# Patient Record
Sex: FEMALE | Race: OTHER | NOT HISPANIC OR LATINO
[De-identification: names, ages, dates, MRNs, and addresses within clinical notes are randomized per-mention and may not be internally consistent; named-entity substitution may affect disease eponyms.]

---

## 2020-03-30 ENCOUNTER — APPOINTMENT (OUTPATIENT)
Dept: HEART AND VASCULAR | Facility: CLINIC | Age: 65
End: 2020-03-30

## 2020-03-30 PROBLEM — Z00.00 ENCOUNTER FOR PREVENTIVE HEALTH EXAMINATION: Status: ACTIVE | Noted: 2020-03-30

## 2022-08-29 ENCOUNTER — APPOINTMENT (OUTPATIENT)
Dept: HEART AND VASCULAR | Facility: CLINIC | Age: 67
End: 2022-08-29

## 2022-08-29 DIAGNOSIS — Q27.9 CONGENITAL MALFORMATION OF PERIPHERAL VASCULAR SYSTEM, UNSPECIFIED: ICD-10-CM

## 2022-08-29 PROCEDURE — 99214 OFFICE O/P EST MOD 30 MIN: CPT

## 2022-08-29 NOTE — PHYSICAL EXAM
[Alert] : alert [No Acute Distress] : no acute distress [Well Nourished] : well nourished [PERRL] : pupils equal, round and reactive to light [Normal Hearing] : hearing was normal [No Neck Mass] : no neck mass was observed [Supple] : the neck was supple [No LAD] : no lymphadenopathy [No Respiratory Distress] : no respiratory distress [Normal Rate and Effort] : normal respiratory rhythm and effort [No Accessory Muscle Use] : no accessory muscle use [Not Tender] : non-tender [Soft] : abdomen soft [Not Distended] : not distended [No Motor Deficits] : the motor exam was normal [No Tremors] : no tremors [No Sensory Deficits] : the sensory exam was normal to light touch and pinprick [Oriented x3] : oriented to person, place, and time [Normal Insight/Judgement] : insight and judgment were intact [Normal Affect] : the affect was normal

## 2022-08-30 NOTE — ADDENDUM
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 She has had multiple surgical procedures as well as numerous embolizations and sclerotherapy procedures, most recently 5 years ago by Dr. Ramos. She lives in [place] and is here just for this consultation. She said her current issue is pain on the dorsum of the left hand where she has extensive venous malformation as well as over the elbow where she also has a prominent soft tissue mass which is tender. The arm shows diffuse involvement with the venous malformation with muscle wasting and in addition she has flexion contractures at the elbow and all of the digits. She has some movement in the hand but it is very weak with incomplete . Sensation is normal. She has had both alcohol and bleomycin embolizations in the past. She said the alcohol was used on the palm and was quite effective although it was accompanied by extensive blistering and prolonged recovery. She said she had good results with the other agents as well. She does not recall how much bleomycin she has had or how many procedures used that agent. Past history is remarkable for hypertension and hyperlipidemia for which she is on medication. Generally she is in good health and works in clerical activity. She can type using both hands without too much difficulty. I went over the options and suggested that we can treat the symptomatic areas most likely using STS foam. She has not had any imaging study for several years so we gave her a prescription for a new MRI. She has an allergy to Diflucan.\par

## 2022-08-30 NOTE — HISTORY OF PRESENT ILLNESS
[FreeTextEntry1] : 67yo F CAD( presented with unstable angina s/p PCI and BROOKLYN to unknown location in 2016, on ASA and Plavix), HTN, HLD with venous malformation involving the left upper extremity from shoulder down to fingers who presents for consultation. \par Mrs Bess came from [facility]. She previously treated by Dr. Kamla Ramos. Last intervention in 2018 with direct embolization of the L elbow and dorsum aspect of the left hand using Bleomycin with good function improvement. In 2013, she underwent embolization of VM involving the palmar aspect of the left hand using EtOH with excellent result.  She had at least 5 procedures with Dr. Ramos. 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 In 1993, she had resection of venous malformation of the left upper hand and vein grafting at [facility] by a CT surgeon complicated by bleeding post op without improvement of symptoms. \par She presents with pain worsening throughout the day with typing at the dorsum aspect of the left hand and hypersensitivity along the VM area in her left elbow. She has significant venous malformation along the all 5 digits of the left hand with purple and blue appearance and three prominent lumps along the dorsum aspect of the left hand.Contracture of the elbow and 2nd, 3rd and 4th digit noted.  \par She's allergic to fluconazole with hives. \par Cardiologist: Britton Gerardo [place]. [place]. \par

## 2022-08-30 NOTE — REASON FOR VISIT
[Consultation] : a consultation visit [FreeTextEntry1] : venous malformation of the left upper extremity

## 2022-08-31 ENCOUNTER — TRANSCRIPTION ENCOUNTER (OUTPATIENT)
Age: 67
End: 2022-08-31

## 2023-02-14 RX ORDER — CHLORHEXIDINE GLUCONATE 213 G/1000ML
1 SOLUTION TOPICAL ONCE
Refills: 0 | Status: DISCONTINUED | OUTPATIENT
Start: 2023-02-15 | End: 2023-02-16

## 2023-02-14 NOTE — H&P ADULT - NSICDXPASTMEDICALHX_GEN_ALL_CORE_FT
PAST MEDICAL HISTORY:  CAD (coronary artery disease)     HLD (hyperlipidemia)     HTN (hypertension)     Venous malformation

## 2023-02-14 NOTE — H&P ADULT - ASSESSMENT
68 yo F with a PMH of HTN, HLD, CAD s/p BROOKLYN in 2016, venous malformation of LUE -- KT Syndrome (from shoulder down to fingers) s/p multiple surgeries since 8 yo including resection of venous malformation of L upper hand and vein grafting in 1993 as well as multiple embolizations most recently 2018 w/ DSE to L elbow and dorsum aspect of L hand using Bleomycin who presented to Dr. Oseguera for consultation. She presents with pain worsening throughout the day with typing at the dorsum aspect of the L hand and hypersensitivity along the VM area in her L elbow. She has significant venous malformation along all the 5 digits of the L hand with purple and blue appearance and 3 prominent lumps along the dorsum aspect of the L hand. Contracture of the elbow and 2nd, 3rd, and 4th digit noted. The arms shows diffuse involvement with the venous malformation with muscle wasting in addition she has flexion contractures at the elbow and all the digits. She has some movement in the hand but it is very weak with incomplete . Sensation is normal. 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 Pt now presents for DSE of known venous malformation        Consent to be obtained by Dr. Roach Team   Risks & benefits of procedure and alternative therapy have been explained to the patient including but not limited to: allergic reaction, bleeding w/possible need for blood transfusion, infection, renal and vascular compromise, limb damage, emergent surgery. Informed consent obtained and in chart.

## 2023-02-14 NOTE — H&P ADULT - HISTORY OF PRESENT ILLNESS
IR: Dr. Oseguera, previously Dr. Ramos  Cardiologist: Dr. Britton Gerardo ([facility])  Escort:  Pharmacy:  COVID:    *Verify Meds*  *Pt lives in [place]    66 yo F with a PMH of HTN, HLD, CAD s/p BROOKLYN in 2016, venous malformation of LUE -- KT Syndrome (from shoulder down to fingers) s/p multiple surgeries since 6 yo including resection of venous malformation of L upper hand and vein grafting in 1993 as well as multiple embolizations most recently 2018 w/ DSE to L elbow and dorsum aspect of L hand using Bleomycin who presented to Dr. Oseguera for consultation. She presents with pain worsening throughout the day with typing at the dorsum aspect of the L hand and hypersensitivity along the VM area in her L elbow. She has significant venous malformation along all the 5 digits of the L hand with purple and blue appearance and 3 prominent lumps along the dorsum aspect of the L hand. Contracture of the elbow and 2nd, 3rd, and 4th digit noted. The arms shows diffuse involvement with the venous malformation with muscle wasting in addition she has flexion contractures at the elbow and all the digits. She has some movement in the hand but it is very weak with incomplete . Sensation is normal. 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 Pt now presents for DSE of known venous malformation.  IR: Dr. Oseguera, previously Dr. Ramos  Cardiologist: Dr. Britton Gerardo ([facility])  Escort:  Pharmacy: [facility] otherwise home pharmacy is [facility] [phone]  COVID: Negative       68 yo F with a PMH of HTN, HLD, CAD s/p BROOKLYN in 2016, venous malformation of LUE -- KT Syndrome (from shoulder down to fingers) s/p multiple surgeries since 6 yo including resection of venous malformation of L upper hand and vein grafting in 1993 as well as multiple embolizations most recently 2018 w/ DSE to L elbow and dorsum aspect of L hand using Bleomycin who presented to Dr. Oseguera for consultation. She presents with pain worsening throughout the day with typing at the dorsum aspect of the L hand and hypersensitivity along the VM area in her L elbow. She has significant venous malformation along all the 5 digits of the L hand with purple and blue appearance and 3 prominent lumps along the dorsum aspect of the L hand. Contracture of the elbow and 2nd, 3rd, and 4th digit noted. The arms shows diffuse involvement with the venous malformation with muscle wasting in addition she has flexion contractures at the elbow and all the digits. She has some movement in the hand but it is very weak with incomplete . Sensation is normal. 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 Pt now presents for DSE of known venous malformation.  IR: Dr. Oseguera, previously Dr. Ramos  Cardiologist: Dr. Britton Gerardo ([facility])  Escort: Does not Have   Pharmacy: [facility] otherwise home pharmacy is [facility] [phone]  COVID: Negative       68 yo F with a PMH of HTN, HLD, CAD s/p BROOKLYN in 2016, venous malformation of LUE -- KT Syndrome (from shoulder down to fingers) s/p multiple surgeries since 6 yo including resection of venous malformation of L upper hand and vein grafting in 1993 as well as multiple embolizations most recently 2018 w/ DSE to L elbow and dorsum aspect of L hand using Bleomycin who presented to Dr. Oseguera for consultation. She presents with pain worsening throughout the day with typing at the dorsum aspect of the L hand and hypersensitivity along the VM area in her L elbow. She has significant venous malformation along all the 5 digits of the L hand with purple and blue appearance and 3 prominent lumps along the dorsum aspect of the L hand. Contracture of the elbow and 2nd, 3rd, and 4th digit noted. The arms shows diffuse involvement with the venous malformation with muscle wasting in addition she has flexion contractures at the elbow and all the digits. She has some movement in the hand but it is very weak with incomplete . Sensation is normal. 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 Pt now presents for DSE of known venous malformation.

## 2023-02-15 ENCOUNTER — INPATIENT (INPATIENT)
Facility: HOSPITAL | Age: 68
LOS: 0 days | Discharge: HOME | DRG: 254 | End: 2023-02-16
Attending: RADIOLOGY | Admitting: RADIOLOGY
Payer: COMMERCIAL

## 2023-02-15 ENCOUNTER — TRANSCRIPTION ENCOUNTER (OUTPATIENT)
Age: 68
End: 2023-02-15

## 2023-02-15 VITALS
DIASTOLIC BLOOD PRESSURE: 56 MMHG | WEIGHT: 134.48 LBS | TEMPERATURE: 99 F | HEIGHT: 66 IN | OXYGEN SATURATION: 97 % | HEART RATE: 77 BPM | RESPIRATION RATE: 16 BRPM | SYSTOLIC BLOOD PRESSURE: 116 MMHG

## 2023-02-15 LAB
ALBUMIN SERPL ELPH-MCNC: 4.2 G/DL — SIGNIFICANT CHANGE UP (ref 3.3–5)
ALP SERPL-CCNC: 67 U/L — SIGNIFICANT CHANGE UP (ref 40–120)
ALT FLD-CCNC: 19 U/L — SIGNIFICANT CHANGE UP (ref 10–45)
ANION GAP SERPL CALC-SCNC: 11 MMOL/L — SIGNIFICANT CHANGE UP (ref 5–17)
APTT BLD: 32.6 SEC — SIGNIFICANT CHANGE UP (ref 27.5–35.5)
AST SERPL-CCNC: 32 U/L — SIGNIFICANT CHANGE UP (ref 10–40)
BASOPHILS # BLD AUTO: 0.06 K/UL — SIGNIFICANT CHANGE UP (ref 0–0.2)
BASOPHILS NFR BLD AUTO: 1.4 % — SIGNIFICANT CHANGE UP (ref 0–2)
BILIRUB SERPL-MCNC: 0.6 MG/DL — SIGNIFICANT CHANGE UP (ref 0.2–1.2)
BUN SERPL-MCNC: 14 MG/DL — SIGNIFICANT CHANGE UP (ref 7–23)
CALCIUM SERPL-MCNC: 9.7 MG/DL — SIGNIFICANT CHANGE UP (ref 8.4–10.5)
CHLORIDE SERPL-SCNC: 107 MMOL/L — SIGNIFICANT CHANGE UP (ref 96–108)
CO2 SERPL-SCNC: 27 MMOL/L — SIGNIFICANT CHANGE UP (ref 22–31)
CREAT SERPL-MCNC: 1.16 MG/DL — SIGNIFICANT CHANGE UP (ref 0.5–1.3)
EGFR: 51 ML/MIN/1.73M2 — LOW
EOSINOPHIL # BLD AUTO: 0.19 K/UL — SIGNIFICANT CHANGE UP (ref 0–0.5)
EOSINOPHIL NFR BLD AUTO: 4.3 % — SIGNIFICANT CHANGE UP (ref 0–6)
GLUCOSE SERPL-MCNC: 105 MG/DL — HIGH (ref 70–99)
HCT VFR BLD CALC: 36.1 % — SIGNIFICANT CHANGE UP (ref 34.5–45)
HGB BLD-MCNC: 12.1 G/DL — SIGNIFICANT CHANGE UP (ref 11.5–15.5)
IMM GRANULOCYTES NFR BLD AUTO: 0.2 % — SIGNIFICANT CHANGE UP (ref 0–0.9)
INR BLD: 1.02 — SIGNIFICANT CHANGE UP (ref 0.88–1.16)
LYMPHOCYTES # BLD AUTO: 1.23 K/UL — SIGNIFICANT CHANGE UP (ref 1–3.3)
LYMPHOCYTES # BLD AUTO: 28.1 % — SIGNIFICANT CHANGE UP (ref 13–44)
MAGNESIUM SERPL-MCNC: 2.1 MG/DL — SIGNIFICANT CHANGE UP (ref 1.6–2.6)
MCHC RBC-ENTMCNC: 30.6 PG — SIGNIFICANT CHANGE UP (ref 27–34)
MCHC RBC-ENTMCNC: 33.5 GM/DL — SIGNIFICANT CHANGE UP (ref 32–36)
MCV RBC AUTO: 91.2 FL — SIGNIFICANT CHANGE UP (ref 80–100)
MONOCYTES # BLD AUTO: 0.38 K/UL — SIGNIFICANT CHANGE UP (ref 0–0.9)
MONOCYTES NFR BLD AUTO: 8.7 % — SIGNIFICANT CHANGE UP (ref 2–14)
NEUTROPHILS # BLD AUTO: 2.51 K/UL — SIGNIFICANT CHANGE UP (ref 1.8–7.4)
NEUTROPHILS NFR BLD AUTO: 57.3 % — SIGNIFICANT CHANGE UP (ref 43–77)
NRBC # BLD: 0 /100 WBCS — SIGNIFICANT CHANGE UP (ref 0–0)
PLATELET # BLD AUTO: 221 K/UL — SIGNIFICANT CHANGE UP (ref 150–400)
POTASSIUM SERPL-MCNC: 4.4 MMOL/L — SIGNIFICANT CHANGE UP (ref 3.5–5.3)
POTASSIUM SERPL-SCNC: 4.4 MMOL/L — SIGNIFICANT CHANGE UP (ref 3.5–5.3)
PROT SERPL-MCNC: 6.4 G/DL — SIGNIFICANT CHANGE UP (ref 6–8.3)
PROTHROM AB SERPL-ACNC: 12.2 SEC — SIGNIFICANT CHANGE UP (ref 10.5–13.4)
RBC # BLD: 3.96 M/UL — SIGNIFICANT CHANGE UP (ref 3.8–5.2)
RBC # FLD: 11.7 % — SIGNIFICANT CHANGE UP (ref 10.3–14.5)
SODIUM SERPL-SCNC: 145 MMOL/L — SIGNIFICANT CHANGE UP (ref 135–145)
WBC # BLD: 4.38 K/UL — SIGNIFICANT CHANGE UP (ref 3.8–10.5)
WBC # FLD AUTO: 4.38 K/UL — SIGNIFICANT CHANGE UP (ref 3.8–10.5)

## 2023-02-15 PROCEDURE — 37241 VASC EMBOLIZE/OCCLUDE VENOUS: CPT

## 2023-02-15 PROCEDURE — 93010 ELECTROCARDIOGRAM REPORT: CPT

## 2023-02-15 RX ORDER — EVOLOCUMAB 140 MG/ML
0 INJECTION, SOLUTION SUBCUTANEOUS
Qty: 0 | Refills: 0 | DISCHARGE

## 2023-02-15 RX ORDER — METOPROLOL TARTRATE 50 MG
25 TABLET ORAL ONCE
Refills: 0 | Status: COMPLETED | OUTPATIENT
Start: 2023-02-15 | End: 2023-02-16

## 2023-02-15 RX ORDER — ASPIRIN/CALCIUM CARB/MAGNESIUM 324 MG
1 TABLET ORAL
Qty: 0 | Refills: 0 | DISCHARGE

## 2023-02-15 RX ORDER — SODIUM CHLORIDE 9 MG/ML
1000 INJECTION, SOLUTION INTRAVENOUS
Refills: 0 | Status: DISCONTINUED | OUTPATIENT
Start: 2023-02-15 | End: 2023-02-16

## 2023-02-15 RX ORDER — ATORVASTATIN CALCIUM 80 MG/1
1 TABLET, FILM COATED ORAL
Qty: 0 | Refills: 0 | DISCHARGE

## 2023-02-15 RX ORDER — ATORVASTATIN CALCIUM 80 MG/1
40 TABLET, FILM COATED ORAL ONCE
Refills: 0 | Status: COMPLETED | OUTPATIENT
Start: 2023-02-15 | End: 2023-02-15

## 2023-02-15 RX ORDER — CLOPIDOGREL BISULFATE 75 MG/1
1 TABLET, FILM COATED ORAL
Qty: 0 | Refills: 0 | DISCHARGE

## 2023-02-15 RX ORDER — ACETAMINOPHEN 500 MG
650 TABLET ORAL EVERY 6 HOURS
Refills: 0 | Status: DISCONTINUED | OUTPATIENT
Start: 2023-02-15 | End: 2023-02-16

## 2023-02-15 RX ORDER — METOPROLOL TARTRATE 50 MG
1 TABLET ORAL
Qty: 0 | Refills: 0 | DISCHARGE

## 2023-02-15 RX ADMIN — Medication 650 MILLIGRAM(S): at 23:14

## 2023-02-15 RX ADMIN — ATORVASTATIN CALCIUM 40 MILLIGRAM(S): 80 TABLET, FILM COATED ORAL at 21:47

## 2023-02-15 RX ADMIN — SODIUM CHLORIDE 100 MILLILITER(S): 9 INJECTION, SOLUTION INTRAVENOUS at 18:57

## 2023-02-15 NOTE — PRE-ANESTHESIA EVALUATION ADULT - NSANTHTIREDRD_ENT_A_CORE
No Other (Free Text): No cervical adenopathy Note Text (......Xxx Chief Complaint.): This diagnosis correlates with the cysts on scalp Detail Level: Simple

## 2023-02-15 NOTE — PRE-ANESTHESIA EVALUATION ADULT - NSANTHOSAYNRD_GEN_A_CORE
No. ELOY screening performed.  STOP BANG Legend: 0-2 = LOW Risk; 3-4 = INTERMEDIATE Risk; 5-8 = HIGH Risk

## 2023-02-15 NOTE — PATIENT PROFILE ADULT - NSPROIMPLANTSMEDDEV_GEN_A_NUR
01/08/2020  Adrienne Andrew is a 41 y.o., female.      Pre-op Assessment         Review of Systems  Social:  Non-Smoker, No Alcohol Use    Hematology/Oncology:  Hematology Normal   Oncology Normal     EENT/Dental:EENT/Dental Normal   Cardiovascular:  Cardiovascular Normal   Denies Angina.  Functional Capacity good / => 4 METS    Pulmonary:  Pulmonary Normal  Denies Shortness of breath.  Denies Recent URI.    Renal/:  Renal/ Normal     Hepatic/GI:  Hepatic/GI Normal    Musculoskeletal:  Musculoskeletal Normal    Neurological:  Neurology Normal    Endocrine:  Endocrine Normal    Dermatological:  Skin Normal    Psych:  Psychiatric Normal              Anesthesia Assessment: Preoperative EQUATION    Planned Procedure: Procedure(s) (LRB):  FIXATION, TENDON, bicep tenodesis (Right)  ARTHROSCOPY, SHOULDER, WITH SUBACROMIAL SPACE DECOMPRESSION (Right)  DEBRIDEMENT, SHOULDER, ARTHROSCOPIC (Right)  Requested Anesthesia Type:General  Surgeon: Charmaine Barr MD  Service: Orthopedics  Known or anticipated Date of Surgery:1/16/2020    Surgeon notes: reviewed    Electronic QUestionnaire Assessment completed via nurse interview with patient.        Triage considerations:     The patient has no apparent active cardiac condition (No unstable coronary Syndrome such as severe unstable angina or recent [<1 month] myocardial infarction, decompensated CHF, severe valvular   disease or significant arrhythmia)    Previous anesthesia records:Not available    Last PCP note: > 1 year ago , within [facility]      Instructions given. (See in Nurse's note)        CBC. CMP, Lipids 1/6/2020      Navigation:                Straight Line to surgery.               No tests, anesthesia preop clinic visit, or consult required.   cardiac stent

## 2023-02-16 ENCOUNTER — TRANSCRIPTION ENCOUNTER (OUTPATIENT)
Age: 68
End: 2023-02-16

## 2023-02-16 VITALS — TEMPERATURE: 97 F

## 2023-02-16 LAB
ANION GAP SERPL CALC-SCNC: 12 MMOL/L — SIGNIFICANT CHANGE UP (ref 5–17)
BUN SERPL-MCNC: 12 MG/DL — SIGNIFICANT CHANGE UP (ref 7–23)
CALCIUM SERPL-MCNC: 9.7 MG/DL — SIGNIFICANT CHANGE UP (ref 8.4–10.5)
CHLORIDE SERPL-SCNC: 106 MMOL/L — SIGNIFICANT CHANGE UP (ref 96–108)
CO2 SERPL-SCNC: 25 MMOL/L — SIGNIFICANT CHANGE UP (ref 22–31)
CREAT SERPL-MCNC: 0.9 MG/DL — SIGNIFICANT CHANGE UP (ref 0.5–1.3)
EGFR: 70 ML/MIN/1.73M2 — SIGNIFICANT CHANGE UP
GLUCOSE SERPL-MCNC: 92 MG/DL — SIGNIFICANT CHANGE UP (ref 70–99)
HCV AB S/CO SERPL IA: 0.04 S/CO — SIGNIFICANT CHANGE UP
HCV AB SERPL-IMP: SIGNIFICANT CHANGE UP
POTASSIUM SERPL-MCNC: 4.5 MMOL/L — SIGNIFICANT CHANGE UP (ref 3.5–5.3)
POTASSIUM SERPL-SCNC: 4.5 MMOL/L — SIGNIFICANT CHANGE UP (ref 3.5–5.3)
SODIUM SERPL-SCNC: 143 MMOL/L — SIGNIFICANT CHANGE UP (ref 135–145)

## 2023-02-16 PROCEDURE — 80048 BASIC METABOLIC PNL TOTAL CA: CPT

## 2023-02-16 PROCEDURE — C1894: CPT

## 2023-02-16 PROCEDURE — 85610 PROTHROMBIN TIME: CPT

## 2023-02-16 PROCEDURE — 83735 ASSAY OF MAGNESIUM: CPT

## 2023-02-16 PROCEDURE — 86803 HEPATITIS C AB TEST: CPT

## 2023-02-16 PROCEDURE — 85730 THROMBOPLASTIN TIME PARTIAL: CPT

## 2023-02-16 PROCEDURE — C1889: CPT

## 2023-02-16 PROCEDURE — 93005 ELECTROCARDIOGRAM TRACING: CPT

## 2023-02-16 PROCEDURE — 36415 COLL VENOUS BLD VENIPUNCTURE: CPT

## 2023-02-16 PROCEDURE — 80053 COMPREHEN METABOLIC PANEL: CPT

## 2023-02-16 PROCEDURE — 85025 COMPLETE CBC W/AUTO DIFF WBC: CPT

## 2023-02-16 RX ORDER — ACETAMINOPHEN 500 MG
1 TABLET ORAL
Qty: 20 | Refills: 0
Start: 2023-02-16 | End: 2023-02-20

## 2023-02-16 RX ORDER — CEPHALEXIN 500 MG
1 CAPSULE ORAL
Qty: 20 | Refills: 0
Start: 2023-02-16 | End: 2023-02-20

## 2023-02-16 RX ADMIN — Medication 650 MILLIGRAM(S): at 00:14

## 2023-02-16 RX ADMIN — Medication 25 MILLIGRAM(S): at 07:40

## 2023-02-16 RX ADMIN — SODIUM CHLORIDE 100 MILLILITER(S): 9 INJECTION, SOLUTION INTRAVENOUS at 02:24

## 2023-02-16 NOTE — DISCHARGE NOTE PROVIDER - NSDCCPCAREPLAN_GEN_ALL_CORE_FT
PRINCIPAL DISCHARGE DIAGNOSIS  Diagnosis: AVM (arteriovenous malformation)  Assessment and Plan of Treatment: You underwent a direct stick embolization to the AVMs in your left arm. Please follow up with Dr. Oseguera in 6 weeks. Monitor for any signs of infection, fever, chills, redness, increased warmth or pain.       PRINCIPAL DISCHARGE DIAGNOSIS  Diagnosis: AVM (arteriovenous malformation)  Assessment and Plan of Treatment: You underwent a direct stick embolization to the AVMs in your left arm and wrist. Please continue Cephalexin (Keflex) 500mg every 6 hours for 5 days and you can take Tylenol extra strenght every 6 hours as needed for pain. Please follow up with Dr. Oseguera in 6 weeks. Monitor for any signs of infection, fever, chills, redness, increased warmth or pain.

## 2023-02-16 NOTE — DISCHARGE NOTE PROVIDER - HOSPITAL COURSE
68 yo F with a PMH of HTN, HLD, CAD s/p BROOKLYN in 2016, venous malformation of LUE -- KT Syndrome (from shoulder down to fingers) s/p multiple surgeries since 8 yo including resection of venous malformation of L upper hand and vein grafting in 1993 as well as multiple embolizations most recently 2018 w/ DSE to L elbow and dorsum aspect of L hand using Bleomycin who presented to Dr. Oseguera for consultation. She presents with pain worsening throughout the day with typing at the dorsum aspect of the L hand and hypersensitivity along the VM area in her L elbow. She has significant venous malformation along all the 5 digits of the L hand with purple and blue appearance and 3 prominent lumps along the dorsum aspect of the L hand. Contracture of the elbow and 2nd, 3rd, and 4th digit noted. The arms shows diffuse involvement with the venous malformation with muscle wasting in addition she has flexion contractures at the elbow and all the digits. She has some movement in the hand but it is very weak with incomplete . Sensation is normal. 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 Pt now presents for DSE of known venous malformation. Pt s/p DSE of L hand and elbow w/ 1.5% STS foam. Pt will resume home ASA and Plavix 24hours after procedure.    Pt seen and examined at bedside this AM without any complaints or events overnight, VSS, labs and telemetry reviewed and pt stable for discharge as discussed with Dr. Oseguera. Pt has received appropriate discharge instructions, including medication regimen, access site management and follow up with Dr. Oseguera in 6 weeks. 66 yo F with a PMH of HTN, HLD, CAD s/p BROOKLYN in 2016, venous malformation of LUE -- KT Syndrome (from shoulder down to fingers) s/p multiple surgeries since 6 yo including resection of venous malformation of L upper hand and vein grafting in 1993 as well as multiple embolizations most recently 2018 w/ DSE to L elbow and dorsum aspect of L hand using Bleomycin who presented to Dr. Oseguera for consultation. She presents with pain worsening throughout the day with typing at the dorsum aspect of the L hand and hypersensitivity along the VM area in her L elbow. She has significant venous malformation along all the 5 digits of the L hand with purple and blue appearance and 3 prominent lumps along the dorsum aspect of the L hand. Contracture of the elbow and 2nd, 3rd, and 4th digit noted. The arms shows diffuse involvement with the venous malformation with muscle wasting in addition she has flexion contractures at the elbow and all the digits. She has some movement in the hand but it is very weak with incomplete . Sensation is normal. 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 Pt now presents for DSE of known venous malformation. Pt s/p DSE of L hand and elbow w/ 1.5% STS foam. Pt will resume home ASA and Plavix 24hours after procedure. Pt started on Keflex 500mg Q6 x 5 days and Tylenol 650mg Q6 PRN pain.    Pt seen and examined at bedside this AM without any complaints or events overnight, VSS, labs and telemetry reviewed and pt stable for discharge as discussed with Dr. Oseguera. Pt has received appropriate discharge instructions, including medication regimen, access site management and follow up with Dr. Oseguera in 6 weeks.

## 2023-02-16 NOTE — DISCHARGE NOTE NURSING/CASE MANAGEMENT/SOCIAL WORK - PATIENT PORTAL LINK FT
You can access the FollowMyHealth Patient Portal offered by [facility] by registering at the following website: [email or web address]. By joining [employer]’s FollowMyHealth portal, you will also be able to view your health information using other applications (apps) compatible with our system.

## 2023-02-16 NOTE — DISCHARGE NOTE PROVIDER - NSDCMRMEDTOKEN_GEN_ALL_CORE_FT
Aspirin Enteric Coated 81 mg oral delayed release tablet: 1 tab(s) orally once a day  atorvastatin 40 mg oral tablet: 1 tab(s) orally once a day  Plavix 75 mg oral tablet: 1 tab(s) orally once a day  Repatha 140 mg/mL subcutaneous solution: subcutaneous every other week  Toprol-XL 25 mg oral tablet, extended release: 1 tab(s) orally once a day   Aspirin Enteric Coated 81 mg oral delayed release tablet: 1 tab(s) orally once a day  atorvastatin 40 mg oral tablet: 1 tab(s) orally once a day  cephalexin 500 mg oral capsule: 1 cap(s) orally every 6 hours   Plavix 75 mg oral tablet: 1 tab(s) orally once a day  Repatha 140 mg/mL subcutaneous solution: subcutaneous every other week  Toprol-XL 25 mg oral tablet, extended release: 1 tab(s) orally once a day  Tylenol Extra Strength 500 mg oral tablet: 1 tab(s) orally every 6 hours

## 2023-02-16 NOTE — DISCHARGE NOTE PROVIDER - CARE PROVIDER_API CALL
Ricky Oseguera)  Diagnostic Radiology  [address] [place] [address]  Phone: [phone]  Fax: [phone]  Follow Up Time:

## 2023-03-01 DIAGNOSIS — Z79.82 LONG TERM (CURRENT) USE OF ASPIRIN: ICD-10-CM

## 2023-03-01 DIAGNOSIS — I25.10 ATHEROSCLEROTIC HEART DISEASE OF NATIVE CORONARY ARTERY WITHOUT ANGINA PECTORIS: ICD-10-CM

## 2023-03-01 DIAGNOSIS — Q27.31 ARTERIOVENOUS MALFORMATION OF VESSEL OF UPPER LIMB: ICD-10-CM

## 2023-03-01 DIAGNOSIS — M24.542 CONTRACTURE, LEFT HAND: ICD-10-CM

## 2023-03-01 DIAGNOSIS — Z95.5 PRESENCE OF CORONARY ANGIOPLASTY IMPLANT AND GRAFT: ICD-10-CM

## 2023-03-01 DIAGNOSIS — Z91.09 OTHER ALLERGY STATUS, OTHER THAN TO DRUGS AND BIOLOGICAL SUBSTANCES: ICD-10-CM

## 2023-03-01 DIAGNOSIS — E78.5 HYPERLIPIDEMIA, UNSPECIFIED: ICD-10-CM

## 2023-03-01 DIAGNOSIS — I10 ESSENTIAL (PRIMARY) HYPERTENSION: ICD-10-CM

## 2025-07-15 NOTE — PROGRESS NOTE ADULT - SUBJECTIVE AND OBJECTIVE BOX
Detail Level: Simple Vascular & Interventional Radiology    Interval history: GUILLERMO. Moderate pain at treatment sites.     Allergies: fluconazole (Hives)    Data:  T(C): 36.2  HR: 64  BP: 111/58  RR: 17  SpO2: 97%    -WBC 4.38 / HgB 12.1 / Hct 36.1 / Plt 221  -Na 145 / Cl 107 / BUN 14 / Glucose 105  -K 4.4 / CO2 27 / Cr 1.16  -ALT 19 / Alk Phos 67 / T.Bili 0.6  -INR1.02      Assessment:  67y Female s/p DSE of L hand and elbow venous malformations on 2/15.    Plan:   - Okay for discharge today.  - Remove bandages today.  - Reg diet.  - OOB as tolerated.  - Tylenol and Ibuprofen prn for pain.  - F/u w/ Dr. Oseguera in 6 weeks.    --  Kostas Ballard MD, PGY-6  Vascular and Interventional Radiology  Available on [employer] Teams    - Nonemergent consults:  [facility] order "Consult- Interventional Radiology"  - Emergent issues (pager): [facility] [phone]; [facility] [phone]; [phone]  - Scheduling questions: [facility] [phone]; [facility] [phone]  - Clinic/outpatient booking: [facility] [phone]; [facility] [phone]  Vascular & Interventional Radiology    Interval history: GUILLERMO. Moderate pain at treatment sites.     On exam, resting in bed in NAD. Normal respirations. L hand and elbow tender to palpation. Bruising over dorsal aspect of hand. L hand  limited by pain.    Allergies: fluconazole (Hives)    Data:  T(C): 36.2  HR: 64  BP: 111/58  RR: 17  SpO2: 97%    -WBC 4.38 / HgB 12.1 / Hct 36.1 / Plt 221  -Na 145 / Cl 107 / BUN 14 / Glucose 105  -K 4.4 / CO2 27 / Cr 1.16  -ALT 19 / Alk Phos 67 / T.Bili 0.6  -INR1.02      Assessment:  67y Female s/p DSE of L hand and elbow venous malformations on 2/15.    Plan:   - Okay for discharge today.  - Remove bandages today.  - Reg diet.  - OOB as tolerated.  - Tylenol and Ibuprofen prn for pain.  - F/u w/ Dr. Oseguera in 6 weeks.    --  Kostas Ballard MD, PGY-6  Vascular and Interventional Radiology  Available on [employer] Teams Detail Level: Generalized